# Patient Record
Sex: MALE | Race: WHITE | NOT HISPANIC OR LATINO | Employment: FULL TIME | URBAN - METROPOLITAN AREA
[De-identification: names, ages, dates, MRNs, and addresses within clinical notes are randomized per-mention and may not be internally consistent; named-entity substitution may affect disease eponyms.]

---

## 2018-12-30 ENCOUNTER — APPOINTMENT (OUTPATIENT)
Dept: RADIOLOGY | Facility: MEDICAL CENTER | Age: 28
End: 2018-12-30
Attending: EMERGENCY MEDICINE
Payer: OTHER GOVERNMENT

## 2018-12-30 ENCOUNTER — HOSPITAL ENCOUNTER (EMERGENCY)
Facility: MEDICAL CENTER | Age: 28
End: 2018-12-30
Attending: EMERGENCY MEDICINE
Payer: OTHER GOVERNMENT

## 2018-12-30 VITALS
WEIGHT: 224.87 LBS | TEMPERATURE: 98.7 F | SYSTOLIC BLOOD PRESSURE: 133 MMHG | OXYGEN SATURATION: 97 % | RESPIRATION RATE: 18 BRPM | DIASTOLIC BLOOD PRESSURE: 71 MMHG | BODY MASS INDEX: 31.48 KG/M2 | HEART RATE: 86 BPM | HEIGHT: 71 IN

## 2018-12-30 DIAGNOSIS — S89.92XA INJURY OF LEFT KNEE, INITIAL ENCOUNTER: ICD-10-CM

## 2018-12-30 PROCEDURE — 73564 X-RAY EXAM KNEE 4 OR MORE: CPT | Mod: LT

## 2018-12-30 PROCEDURE — 99283 EMERGENCY DEPT VISIT LOW MDM: CPT

## 2018-12-30 PROCEDURE — 700102 HCHG RX REV CODE 250 W/ 637 OVERRIDE(OP): Performed by: EMERGENCY MEDICINE

## 2018-12-30 PROCEDURE — A9270 NON-COVERED ITEM OR SERVICE: HCPCS | Performed by: EMERGENCY MEDICINE

## 2018-12-30 RX ORDER — IBUPROFEN 600 MG/1
600 TABLET ORAL ONCE
Status: COMPLETED | OUTPATIENT
Start: 2018-12-30 | End: 2018-12-30

## 2018-12-30 RX ADMIN — IBUPROFEN 600 MG: 600 TABLET ORAL at 17:22

## 2018-12-30 ASSESSMENT — PAIN SCALES - GENERAL: PAINLEVEL_OUTOF10: 4

## 2018-12-31 NOTE — ED NOTES
Pt discharged with written instructions. Pt verbalized understanding. Pt's AAOx4. Pt demonstrated proper crutch use. Pt with ride home by family.

## 2018-12-31 NOTE — ED PROVIDER NOTES
"ED Provider Note    CHIEF COMPLAINT   Chief Complaint   Patient presents with   • Knee Injury       HPI   Jhony Campos is a 28 y.o. male who presents to the ED secondary to left knee pain.  The patient states that he was sledding, excellently caught his knee, felt a pop, twisted the knee.  Patient did not hit his head did not pass out.  Patient denies any numbness, tingling, weakness.  The patient is having difficulty ambulating on it.  He did not hit his head did not pass out, no chest pain, shortness of breath    REVIEW OF SYSTEMS   See HPI for further details.    PAST MEDICAL HISTORY   History reviewed. No pertinent past medical history.    FAMILY HISTORY  History reviewed. No pertinent family history.    SOCIAL HISTORY  Social History     Social History   • Marital status:      Spouse name: N/A   • Number of children: N/A   • Years of education: N/A     Social History Main Topics   • Smoking status: Never Smoker   • Smokeless tobacco: Never Used   • Alcohol use No   • Drug use: No   • Sexual activity: Not on file     Other Topics Concern   • Not on file     Social History Narrative   • No narrative on file       SURGICAL HISTORY  History reviewed. No pertinent surgical history.    CURRENT MEDICATIONS   Home Medications    **Home medications have not yet been reviewed for this encounter**         ALLERGIES   Allergies   Allergen Reactions   • Penicillins Anaphylaxis       PHYSICAL EXAM  VITAL SIGNS: /71   Pulse 86   Temp 37.1 °C (98.7 °F) (Temporal)   Resp 18   Ht 1.803 m (5' 11\")   Wt 102 kg (224 lb 13.9 oz)   SpO2 97%   BMI 31.36 kg/m²   Constitutional: Well developed, Well nourished, moderate distress, Non-toxic appearance.   HENT:  Atraumatic, Normocephalic, Oral pharynx with moist mucous membranes.   Eyes: EOMI, PERRL    Skin: Warm, Dry, No erythema, No rash.   Musculoskeletal: Tenderness palpation over the medial aspect of the left knee with moderate joint effusion, decreased range of " motion secondary to pain, no ankle or foot or hip tenderness palpation,  Neurologic: Alert & oriented x 3,     RADIOLOGY/PROCEDURES  DX-KNEE COMPLETE 4+ LEFT   Final Result      Unremarkable left knee series.            COURSE & MEDICAL DECISION MAKING  Pertinent Labs & Imaging studies reviewed. (See chart for details)  Patient with knee injury, likely sprain, x-rays negative, but the patient in a knee immobilizer, crutches, have the patient take Tylenol ibuprofen for the patient's symptoms, have the patient follow-up with Dr. Zimmerman as an outpatient, return with any other concerns.        FINAL IMPRESSION  1. Injury of left knee, initial encounter        Patient referred to primary care provider for blood pressure management     This dictation was created using voice recognition software. The accuracy of the dictation is limited to the abilities of the software. I expect there may be some errors of grammar and possibly content. The nursing notes were reviewed and certain aspects of this information were incorporated into this note.    Electronically signed by: Jamil Tapia, 12/30/2018 5:02 PM